# Patient Record
Sex: MALE | URBAN - METROPOLITAN AREA
[De-identification: names, ages, dates, MRNs, and addresses within clinical notes are randomized per-mention and may not be internally consistent; named-entity substitution may affect disease eponyms.]

---

## 2019-08-05 NOTE — PATIENT DISCUSSION
Denies trauma, ambulatory with cane, hip replacement 1 year ago No retinal holes, tears, or detachment at this time.

## 2020-04-30 ENCOUNTER — IMPORTED ENCOUNTER (OUTPATIENT)
Dept: URBAN - METROPOLITAN AREA CLINIC 1 | Facility: CLINIC | Age: 55
End: 2020-04-30

## 2020-04-30 PROBLEM — H52.03: Noted: 2020-04-30

## 2020-04-30 PROBLEM — H52.4: Noted: 2020-04-30

## 2020-04-30 PROCEDURE — S0620 ROUTINE OPHTHALMOLOGICAL EXA: HCPCS

## 2020-04-30 NOTE — PATIENT DISCUSSION
1.  Hyperopia w/ Presbyopia -- Rx was given for corrective spectacles if indicated. 2.  Cataract OU -- Observe for now without intervention. 3. Pterygium OU -- Patient should wear Sunglasses when exposed to UV light. Return for an appointment in 1 year 36 with Dr. Merrick Beltrán.

## 2022-04-02 ASSESSMENT — VISUAL ACUITY
OS_CC: 20/60+2
OS_SC: 20/20-2
OD_CC: J1+
OD_SC: 20/20
OD_CC: 20/50
OS_CC: J1

## 2022-04-02 ASSESSMENT — TONOMETRY
OD_IOP_MMHG: 17
OS_IOP_MMHG: 17

## 2022-06-24 ENCOUNTER — COMPREHENSIVE EXAM (OUTPATIENT)
Dept: URBAN - METROPOLITAN AREA CLINIC 1 | Facility: CLINIC | Age: 57
End: 2022-06-24

## 2022-06-24 DIAGNOSIS — H52.03: ICD-10-CM

## 2022-06-24 DIAGNOSIS — H52.223: ICD-10-CM

## 2022-06-24 DIAGNOSIS — H52.4: ICD-10-CM

## 2022-06-24 PROCEDURE — 92014 COMPRE OPH EXAM EST PT 1/>: CPT

## 2022-06-24 ASSESSMENT — VISUAL ACUITY
OD_CC: J1
OS_CC: J1
OS_CC: 20/25
OD_CC: 20/25

## 2022-06-24 ASSESSMENT — TONOMETRY
OD_IOP_MMHG: 14
OS_IOP_MMHG: 14

## 2023-11-28 ENCOUNTER — COMPREHENSIVE EXAM (OUTPATIENT)
Facility: LOCATION | Age: 58
End: 2023-11-28

## 2023-11-28 DIAGNOSIS — H25.11: ICD-10-CM

## 2023-11-28 DIAGNOSIS — H52.4: ICD-10-CM

## 2023-11-28 PROCEDURE — 92014 COMPRE OPH EXAM EST PT 1/>: CPT

## 2023-11-28 PROCEDURE — 92015 DETERMINE REFRACTIVE STATE: CPT

## 2023-11-28 PROCEDURE — 92499OP2 OPTOMAP RETINAL SCREENING BOTH EYES

## 2023-11-28 ASSESSMENT — KERATOMETRY
OS_AXISANGLE_DEGREES: 27
OS_K2POWER_DIOPTERS: 41.50
OD_AXISANGLE2_DEGREES: 22
OD_K2POWER_DIOPTERS: 41.75
OS_K1POWER_DIOPTERS: 41.25
OD_AXISANGLE_DEGREES: 112
OD_K1POWER_DIOPTERS: 41.25
OS_AXISANGLE2_DEGREES: 117

## 2023-11-28 ASSESSMENT — VISUAL ACUITY
OD_CC: 20/25
OU_CC: 4 PT
OS_CC: 20/20
OU_CC: 20/20
OS_CC: 4 PT

## 2023-11-28 ASSESSMENT — TONOMETRY
OD_IOP_MMHG: 12
OS_IOP_MMHG: 11